# Patient Record
Sex: FEMALE | Race: OTHER
[De-identification: names, ages, dates, MRNs, and addresses within clinical notes are randomized per-mention and may not be internally consistent; named-entity substitution may affect disease eponyms.]

---

## 2023-01-26 PROBLEM — Z00.00 ENCOUNTER FOR PREVENTIVE HEALTH EXAMINATION: Status: ACTIVE | Noted: 2023-01-26

## 2023-02-09 ENCOUNTER — APPOINTMENT (OUTPATIENT)
Dept: PULMONOLOGY | Facility: CLINIC | Age: 38
End: 2023-02-09
Payer: MEDICAID

## 2023-02-09 VITALS
HEART RATE: 82 BPM | BODY MASS INDEX: 33.8 KG/M2 | WEIGHT: 198 LBS | DIASTOLIC BLOOD PRESSURE: 73 MMHG | OXYGEN SATURATION: 96 % | SYSTOLIC BLOOD PRESSURE: 110 MMHG | HEIGHT: 64 IN | TEMPERATURE: 97.8 F

## 2023-02-09 DIAGNOSIS — R06.81 APNEA, NOT ELSEWHERE CLASSIFIED: ICD-10-CM

## 2023-02-09 DIAGNOSIS — R06.83 SNORING: ICD-10-CM

## 2023-02-09 PROCEDURE — 99204 OFFICE O/P NEW MOD 45 MIN: CPT

## 2023-02-09 NOTE — PHYSICAL EXAM
[General Appearance - In No Acute Distress] : no acute distress [Normal Oropharynx] : normal oropharynx [Neck Appearance] : the appearance of the neck was normal [Apical Impulse] : the apical impulse was normal [Heart Sounds] : normal S1 and S2 [Exaggerated Use Of Accessory Muscles For Inspiration] : no accessory muscle use [Abnormal Walk] : normal gait [Involuntary Movements] : no involuntary movements were seen [No Focal Deficits] : no focal deficits [Oriented To Time, Place, And Person] : oriented to person, place, and time [Affect] : the affect was normal [II] : II

## 2023-02-09 NOTE — HISTORY OF PRESENT ILLNESS
[FreeTextEntry1] : 02/09/2023 :  ISHA CATALAN is a 37 year old female with PMHx obesity who is here for clearance as part of bariatric surgery. \par \par She smokes marijuana twice/week x 22 years. She  was told of loud snoring by her boy friend but is unsure about apnea, she is not feeling refreshed in morning and c/o occasional morning HA  and dry mouth. She is scheduled to have bariatric surgery on 2/15 at [facility]  \par \par \par Sleep Routine:\par \par She goes to bed at 11P, sleep latency is about 30 min, she wakes up twice, WASO is about 15 min, and then she is up at 10A. She naps everyday x 30 min. [place] sleepiness scale (out of 24 points) = 11\par \par \par \par She denies cataplexy, RLS, parasomnia, or any other sleep behavioral issues.

## 2023-02-09 NOTE — ASSESSMENT
[FreeTextEntry1] : 38 y/o obese F who is here for evaluation of possible sleep apnea as part of her bariatric surgery w/u. Snoring, mild excessive daytime somnolence.  Moderate risk for sleep disordered breathing.  Will try to get testing done before surgery which is scheduled for next week.

## 2023-02-09 NOTE — REVIEW OF SYSTEMS
[EDS: ESS=____] : daytime somnolence: ESS=[unfilled] [Fatigue] : fatigue [Snoring] : snoring [Witnessed Apneas] : witnessed apnea [Obesity] : obesity [Negative] : Psychiatric

## 2023-02-27 ENCOUNTER — APPOINTMENT (OUTPATIENT)
Dept: PULMONOLOGY | Facility: CLINIC | Age: 38
End: 2023-02-27

## 2023-03-06 ENCOUNTER — APPOINTMENT (OUTPATIENT)
Dept: SLEEP CENTER | Facility: HOME HEALTH | Age: 38
End: 2023-03-06
Payer: MEDICAID

## 2023-03-06 ENCOUNTER — OUTPATIENT (OUTPATIENT)
Dept: OUTPATIENT SERVICES | Facility: HOSPITAL | Age: 38
LOS: 1 days | End: 2023-03-06
Payer: COMMERCIAL

## 2023-03-06 DIAGNOSIS — G47.33 OBSTRUCTIVE SLEEP APNEA (ADULT) (PEDIATRIC): ICD-10-CM

## 2023-03-06 PROCEDURE — 95800 SLP STDY UNATTENDED: CPT

## 2023-03-06 PROCEDURE — 95800 SLP STDY UNATTENDED: CPT | Mod: 26

## 2023-03-28 ENCOUNTER — APPOINTMENT (OUTPATIENT)
Dept: PULMONOLOGY | Facility: CLINIC | Age: 38
End: 2023-03-28
Payer: MEDICAID

## 2023-03-28 DIAGNOSIS — F12.90 CANNABIS USE, UNSPECIFIED, UNCOMPLICATED: ICD-10-CM

## 2023-03-28 DIAGNOSIS — G47.33 OBSTRUCTIVE SLEEP APNEA (ADULT) (PEDIATRIC): ICD-10-CM

## 2023-03-28 DIAGNOSIS — E66.9 OBESITY, UNSPECIFIED: ICD-10-CM

## 2023-03-28 PROCEDURE — 99442: CPT

## 2023-03-28 NOTE — REASON FOR VISIT
[Home] : at home, [unfilled] , at the time of the visit. [Medical Office: ([facility])___] : at the medical office located in  [Verbal consent obtained from patient] : the patient, [unfilled]

## 2023-03-28 NOTE — HISTORY OF PRESENT ILLNESS
[FreeTextEntry1] : 02/09/2023 :  ISHA CATALAN is a 37 year old female with PMHx obesity who is here for clearance as part of bariatric surgery. \par \par She smokes marijuana twice/week x 22 years. She  was told of loud snoring by her boy friend but is unsure about apnea, she is not feeling refreshed in morning and c/o occasional morning HA  and dry mouth. She is scheduled to have bariatric surgery on 2/15 at [facility]  \par \par 3/28/23: Reviewed home sleep study done March 6, 2023.  5 hours of sleep recorded, mild sleep disordered breathing with apnea-hypopnea index 7.6 (4%).  She is planning bariatric surgery but does not yet have a date.

## 2023-03-28 NOTE — ASSESSMENT
[FreeTextEntry1] : Mild sleep disordered breathing, should be no contraindication to surgery and I would expect improvement after weight loss.  There is no history of asthma or any other pulmonary problem and she has never smoked cigarettes.  I think she is cleared for bariatric surgery from our standpoint.